# Patient Record
Sex: MALE | Race: WHITE | NOT HISPANIC OR LATINO | Employment: FULL TIME | URBAN - NONMETROPOLITAN AREA
[De-identification: names, ages, dates, MRNs, and addresses within clinical notes are randomized per-mention and may not be internally consistent; named-entity substitution may affect disease eponyms.]

---

## 2017-09-25 ENCOUNTER — NON-PROVIDER VISIT (OUTPATIENT)
Dept: URGENT CARE | Facility: PHYSICIAN GROUP | Age: 22
End: 2017-09-25

## 2017-09-25 DIAGNOSIS — Z02.1 PRE-EMPLOYMENT DRUG SCREENING: ICD-10-CM

## 2017-09-25 LAB
AMP AMPHETAMINE: NORMAL
COC COCAINE: NORMAL
INT CON NEG: NEGATIVE
INT CON POS: POSITIVE
MET METHAMPHETAMINES: NORMAL
OPI OPIATES: NORMAL
PCP PHENCYCLIDINE: NORMAL
POC DRUG COMMENT 753798-OCCUPATIONAL HEALTH: NORMAL
THC: NORMAL

## 2017-09-25 PROCEDURE — 80305 DRUG TEST PRSMV DIR OPT OBS: CPT | Performed by: PHYSICIAN ASSISTANT

## 2022-02-18 ENCOUNTER — HOSPITAL ENCOUNTER (OUTPATIENT)
Facility: MEDICAL CENTER | Age: 27
End: 2022-02-18
Attending: PHYSICIAN ASSISTANT
Payer: COMMERCIAL

## 2022-02-18 ENCOUNTER — OFFICE VISIT (OUTPATIENT)
Dept: URGENT CARE | Facility: PHYSICIAN GROUP | Age: 27
End: 2022-02-18
Payer: COMMERCIAL

## 2022-02-18 VITALS
DIASTOLIC BLOOD PRESSURE: 80 MMHG | RESPIRATION RATE: 16 BRPM | TEMPERATURE: 98.9 F | OXYGEN SATURATION: 97 % | HEIGHT: 75 IN | HEART RATE: 86 BPM | BODY MASS INDEX: 39.17 KG/M2 | SYSTOLIC BLOOD PRESSURE: 110 MMHG | WEIGHT: 315 LBS

## 2022-02-18 DIAGNOSIS — R11.0 NAUSEA: ICD-10-CM

## 2022-02-18 DIAGNOSIS — J06.9 UPPER RESPIRATORY TRACT INFECTION, UNSPECIFIED TYPE: ICD-10-CM

## 2022-02-18 DIAGNOSIS — R05.9 COUGH: ICD-10-CM

## 2022-02-18 LAB
FLUAV+FLUBV AG SPEC QL IA: NEGATIVE
INT CON NEG: NEGATIVE
INT CON POS: POSITIVE

## 2022-02-18 PROCEDURE — 99203 OFFICE O/P NEW LOW 30 MIN: CPT | Performed by: PHYSICIAN ASSISTANT

## 2022-02-18 PROCEDURE — U0003 INFECTIOUS AGENT DETECTION BY NUCLEIC ACID (DNA OR RNA); SEVERE ACUTE RESPIRATORY SYNDROME CORONAVIRUS 2 (SARS-COV-2) (CORONAVIRUS DISEASE [COVID-19]), AMPLIFIED PROBE TECHNIQUE, MAKING USE OF HIGH THROUGHPUT TECHNOLOGIES AS DESCRIBED BY CMS-2020-01-R: HCPCS

## 2022-02-18 PROCEDURE — 87804 INFLUENZA ASSAY W/OPTIC: CPT | Performed by: PHYSICIAN ASSISTANT

## 2022-02-18 PROCEDURE — U0005 INFEC AGEN DETEC AMPLI PROBE: HCPCS

## 2022-02-18 RX ORDER — ONDANSETRON HYDROCHLORIDE 8 MG/1
8 TABLET, FILM COATED ORAL EVERY 8 HOURS PRN
Qty: 15 TABLET | Refills: 0 | Status: SHIPPED | OUTPATIENT
Start: 2022-02-18 | End: 2022-06-04

## 2022-02-19 DIAGNOSIS — R05.9 COUGH: ICD-10-CM

## 2022-02-19 DIAGNOSIS — J06.9 UPPER RESPIRATORY TRACT INFECTION, UNSPECIFIED TYPE: ICD-10-CM

## 2022-02-19 LAB
COVID ORDER STATUS COVID19: NORMAL
SARS-COV-2 RNA RESP QL NAA+PROBE: NOTDETECTED
SPECIMEN SOURCE: NORMAL

## 2022-02-22 ASSESSMENT — ENCOUNTER SYMPTOMS
VOMITING: 1
EYE DISCHARGE: 0
NAUSEA: 1
SINUS PRESSURE: 1
MYALGIAS: 1
HEADACHES: 1
DIZZINESS: 0
COUGH: 1
CHILLS: 1
EYE REDNESS: 0
SORE THROAT: 1
NECK PAIN: 0
SHORTNESS OF BREATH: 0
SPUTUM PRODUCTION: 0
WHEEZING: 0
FATIGUE: 1
MUSCULOSKELETAL PAIN: 1

## 2022-02-22 NOTE — PROGRESS NOTES
Subjective     Junaid Winn is a 26 y.o. male who presents with Nausea (Today/), Vomiting (X 2 today), Headache, Muscle Pain, and Fatigue (Started yesterday)          Patient is a pleasant 26-year-old male who presents to urgent care with nausea, vomiting, headache, body aches profound fatigue that all started yesterday.  Patient is able to tolerate p.o. fluids along with food however continues to have mild nausea.  Nausea  Associated symptoms include chills, congestion, coughing, fatigue, headaches, myalgias, nausea, a sore throat and vomiting. Pertinent negatives include no neck pain or rash.   Vomiting  Associated symptoms include chills, congestion, coughing, fatigue, headaches, myalgias, nausea, a sore throat and vomiting. Pertinent negatives include no neck pain or rash.   Headache   This is a new problem. The current episode started yesterday. The problem has been gradually worsening. The pain is located in the bilateral region. Associated symptoms include coughing, muscle aches, nausea, sinus pressure, a sore throat and vomiting. Pertinent negatives include no dizziness, ear pain, eye redness or neck pain.   Muscle Pain  Associated symptoms include chills, congestion, coughing, fatigue, headaches, myalgias, nausea, a sore throat and vomiting. Pertinent negatives include no neck pain or rash.   Fatigue  Associated symptoms include chills, congestion, coughing, fatigue, headaches, myalgias, nausea, a sore throat and vomiting. Pertinent negatives include no neck pain or rash.   Of note patient is with his girlfriend today who is also being evaluated for similar symptoms.  Patient does work out [employer] and has significant Covid exposure.  Denies vaccination to COVID-19.    Review of Systems   Constitutional: Positive for chills, fatigue and malaise/fatigue.   HENT: Positive for congestion, sinus pressure and sore throat. Negative for ear discharge and ear pain.    Eyes: Negative for discharge and redness.  "  Respiratory: Positive for cough. Negative for sputum production, shortness of breath and wheezing.    Gastrointestinal: Positive for nausea and vomiting.   Musculoskeletal: Positive for myalgias. Negative for neck pain.   Skin: Negative for itching and rash.   Neurological: Positive for headaches. Negative for dizziness.              Objective     /80   Pulse 86   Temp 37.2 °C (98.9 °F) (Temporal)   Resp 16   Ht 1.905 m (6' 3\")   Wt (!) 144 kg (317 lb)   SpO2 97%   BMI 39.62 kg/m²    PMH:  has a past medical history of Psychiatric problem.  MEDS: Reviewed .   ALLERGIES: No Known Allergies  SURGHX:   Past Surgical History:   Procedure Laterality Date   • PERCUTANEOUOSPINNING UPPER EXTREMITY Right 5/4/2015    Procedure: PERCUTANEOUOSPINNING UPPER EXTREMITY;  Surgeon: Juan Manuel Frances M.D.;  Location: SURGERY [facility];  Service:      SOCHX:  reports that he has never smoked. He has never used smokeless tobacco. He reports previous drug use. Drug: Inhaled. He reports that he does not drink alcohol.  FH: Family history was reviewed, no pertinent findings to report    Physical Exam  Vitals reviewed.   Constitutional:       Appearance: Normal appearance. He is well-developed.   HENT:      Head: Normocephalic and atraumatic.      Ears:      Comments: Bilateral clear middle ear effusions.     Nose:      Comments: Rhinorrhea noted.     Mouth/Throat:      Comments: Posterior oropharynx is erythematous, positive postnasal drainage.  No evidence of exudate.  Eyes:      Conjunctiva/sclera: Conjunctivae normal.      Pupils: Pupils are equal, round, and reactive to light.   Cardiovascular:      Rate and Rhythm: Normal rate and regular rhythm.      Heart sounds: No murmur heard.  Pulmonary:      Effort: Pulmonary effort is normal. No respiratory distress.      Breath sounds: Normal breath sounds.   Musculoskeletal:         General: Normal range of motion.      Cervical back: Normal range of motion and neck " supple.      Right lower leg: No edema.      Left lower leg: No edema.   Lymphadenopathy:      Cervical: No cervical adenopathy.   Skin:     General: Skin is warm.      Findings: No rash.   Neurological:      Mental Status: He is alert and oriented to person, place, and time.   Psychiatric:         Mood and Affect: Mood normal.         Behavior: Behavior normal.         Thought Content: Thought content normal.                             Assessment & Plan        1. Upper respiratory tract infection, unspecified type  - COVID/SARS CoV-2 PCR; Future  - POCT Influenza A/B    2. Cough  - COVID/SARS CoV-2 PCR; Future  - POCT Influenza A/B    3. Nausea  - ondansetron (ZOFRAN) 8 MG Tab; Take 1 Tablet by mouth every 8 hours as needed for Nausea/Vomiting.  Dispense: 15 Tablet; Refill: 0              Neg. Flu.   Appropriate PPE worn at all times by provider.   Pt. Had face mask on throughout entirety of the visit other than oropharyngeal examination today.     Testing performed for COVID-19.    Patient currently without indication of need for higher level of care.    Reviewed with patient/guardian that if they do test positive they will be contacted by their local health department regarding return to work/school protocols.  Results will also be [facility] or called to the patient/guardian directly. Discussed isolation/quarantine recommendations.  Encouraged mask use, frequent handwashing, wiping down hard surfaces, etc.    Patient and/or guardian given precautionary s/sx that mandate immediate follow up and evaluation in the ED. Advised of risks of not doing so.  Side effects of OTC or prescribed medications discussed.   DDX, Supportive care, and indications for immediate follow-up discussed.  Instructed to return to clinic or nearest emergency department if we are not available for any change in condition, further concerns, or worsening of symptoms.    The patient and/or guardian demonstrated a  good understanding and agreed with the treatment plan.    Please note that this dictation was created using voice recognition software. I have made every reasonable attempt to correct obvious errors, but I expect that there are errors of grammar and possibly content that I did not discover before finalizing the note.

## 2022-06-04 ENCOUNTER — OFFICE VISIT (OUTPATIENT)
Dept: URGENT CARE | Facility: PHYSICIAN GROUP | Age: 27
End: 2022-06-04
Payer: COMMERCIAL

## 2022-06-04 VITALS
DIASTOLIC BLOOD PRESSURE: 80 MMHG | SYSTOLIC BLOOD PRESSURE: 110 MMHG | TEMPERATURE: 98.4 F | WEIGHT: 315 LBS | HEIGHT: 76 IN | OXYGEN SATURATION: 97 % | HEART RATE: 86 BPM | RESPIRATION RATE: 16 BRPM | BODY MASS INDEX: 38.36 KG/M2

## 2022-06-04 DIAGNOSIS — J35.8 TONSILLAR EXUDATE: ICD-10-CM

## 2022-06-04 DIAGNOSIS — Z11.52 ENCOUNTER FOR SCREENING FOR COVID-19: ICD-10-CM

## 2022-06-04 DIAGNOSIS — R11.2 NAUSEA AND VOMITING, INTRACTABILITY OF VOMITING NOT SPECIFIED, UNSPECIFIED VOMITING TYPE: ICD-10-CM

## 2022-06-04 DIAGNOSIS — R68.89 FLU-LIKE SYMPTOMS: ICD-10-CM

## 2022-06-04 LAB
EXTERNAL QUALITY CONTROL: NORMAL
FLUAV+FLUBV AG SPEC QL IA: NEGATIVE
INT CON NEG: NEGATIVE
INT CON NEG: NEGATIVE
INT CON POS: POSITIVE
INT CON POS: POSITIVE
S PYO AG THROAT QL: NEGATIVE
SARS-COV+SARS-COV-2 AG RESP QL IA.RAPID: NEGATIVE

## 2022-06-04 PROCEDURE — 87804 INFLUENZA ASSAY W/OPTIC: CPT | Performed by: NURSE PRACTITIONER

## 2022-06-04 PROCEDURE — 87880 STREP A ASSAY W/OPTIC: CPT | Performed by: NURSE PRACTITIONER

## 2022-06-04 PROCEDURE — 99214 OFFICE O/P EST MOD 30 MIN: CPT | Mod: CS | Performed by: NURSE PRACTITIONER

## 2022-06-04 PROCEDURE — 87426 SARSCOV CORONAVIRUS AG IA: CPT | Performed by: NURSE PRACTITIONER

## 2022-06-04 RX ORDER — ONDANSETRON 4 MG/1
4 TABLET, FILM COATED ORAL EVERY 4 HOURS PRN
Qty: 20 TABLET | Refills: 0 | Status: SHIPPED | OUTPATIENT
Start: 2022-06-04 | End: 2022-06-08

## 2022-06-04 ASSESSMENT — ENCOUNTER SYMPTOMS
SINUS PAIN: 0
CHILLS: 1
SPUTUM PRODUCTION: 0
CONSTIPATION: 0
FEVER: 0
SHORTNESS OF BREATH: 0
COUGH: 0
VOMITING: 0
ABDOMINAL PAIN: 0
NAUSEA: 1
DIARRHEA: 0
HEARTBURN: 0
PALPITATIONS: 0
SORE THROAT: 0

## 2022-06-04 NOTE — LETTER
[facility] [address] [address]     June 4, 2022    Patient: Junaid Winn   YOB: 1995   Date of Visit: 6/4/2022       To Whom It May Concern:    Junaid Winn was seen and treated in our department on 6/4/2022. May return to work on June 7, 2022.     Sincerely,         ANTOINE Romero.

## 2022-06-04 NOTE — PATIENT INSTRUCTIONS
HELPS FOR COLDS OR VIRUSES    - Tylenol 650mg every 4-6 hours as needed       - Robitussin plain or DM 2 teaspoons every 4-6 hours as needed  -  Hot team with honey  - Warm salt water gargles         - Saline nasal spray ([place]cean)

## 2022-06-04 NOTE — PROGRESS NOTES
"Subjective     Junaid Winn is a 26 y.o. male who presents with Nausea (X 2-3 days), Nasal Congestion (X 2-3 days), Lightheadedness (X 2-3 days), and Fatigue (X 2-3 days)            Started 2 days ago with nausea, Vomited once, some chills,  nasal congestion, lightheadedness and fatigue.   No Fever, oss of teaset or smell, diarrhea, ear pain, sore throat, cough.  Not COVID/Flu Vacc.  Has not done at home COVID test.  People at work have similar symptome's.   OTC: IBU that has helped.       Review of Systems   Constitutional: Positive for chills and malaise/fatigue. Negative for fever.   HENT: Positive for congestion. Negative for ear pain, sinus pain and sore throat.    Respiratory: Negative for cough, sputum production and shortness of breath.    Cardiovascular: Negative for chest pain and palpitations.   Gastrointestinal: Positive for nausea. Negative for abdominal pain, constipation, diarrhea, heartburn and vomiting.   Neurological:        Lightheadedness              Objective     /80   Pulse 86   Temp 36.9 °C (98.4 °F) (Temporal)   Resp 16   Ht 1.93 m (6' 4\")   Wt (!) 149 kg (329 lb)   SpO2 97%   BMI 40.05 kg/m²      Physical Exam  Constitutional:       Appearance: Normal appearance. He is not ill-appearing.   HENT:      Right Ear: Tympanic membrane is erythematous. Tympanic membrane is not retracted or bulging.      Left Ear: Tympanic membrane is erythematous. Tympanic membrane is not retracted or bulging.      Nose: Congestion and rhinorrhea present.      Right Turbinates: Swollen.      Left Turbinates: Swollen.      Right Sinus: No maxillary sinus tenderness or frontal sinus tenderness.      Left Sinus: No maxillary sinus tenderness or frontal sinus tenderness.      Mouth/Throat:      Pharynx: Posterior oropharyngeal erythema present.      Tonsils: Tonsillar exudate present. No tonsillar abscesses.   Cardiovascular:      Pulses: Normal pulses.      Heart sounds: Normal heart sounds. "   Pulmonary:      Effort: Pulmonary effort is normal. No respiratory distress.      Breath sounds: Normal breath sounds. No stridor. No wheezing or rales.   Lymphadenopathy:      Cervical: No cervical adenopathy.   Skin:     General: Skin is warm and dry.   Neurological:      General: No focal deficit present.      Mental Status: He is alert and oriented to person, place, and time.       Component      Latest Ref Rng & Units 6/4/2022 6/4/2022 6/4/2022          10:36 AM 10:37 AM 10:38 AM   Rapid Influenza A-B         negative   Internal Control Positive       Positive  Positive   Internal Control Negative       Negative  Negative   Rapid Strep Screen       negative     Internal         Valid    SARS-COV ANTIGEN NIKA      Negative, Indeterminate, None Detected, Valid, Invalid, Pass  Negative        Assessment & Plan      26-year-old patient presents with viral-like symptoms.  COVID test was negative.  Influenza was negative.  Strep was negative.  Discussed at length supportive care including warm salt water gargles, hot tea with lemon, humidifiers, ensuring adequate hydration and nutrition.  Due to symptoms of nausea, prescription of Zofran sent to pharmacy.  Work note was provided.  If symptoms persist or worsen patient should be followed up in urgent care, ER, or by primary care provider in 3 to 5 days.    1. Encounter for screening for COVID-19  - POCT SARS-COV Antigen NIKA Manual Result    2. Tonsillar exudate  - POCT Rapid Strep A    3. Flu-like symptoms  - POCT Influenza A/B    4. Nausea and vomiting, intractability of vomiting not specified, unspecified vomiting type  - ondansetron (ZOFRAN) 4 MG Tab tablet; Take 1 Tablet by mouth every four hours as needed for Nausea/Vomiting for up to 4 days.  Dispense: 20 Tablet; Refill: 0             There are no diagnoses linked to this encounter.         This dictation was created using voice recognition software. I have made reasonable attempts to correct  errors, however, errors of grammar and content may exist.    Return if symptoms worsen or fail to improve.

## 2022-06-05 ENCOUNTER — TELEPHONE (OUTPATIENT)
Dept: URGENT CARE | Facility: CLINIC | Age: 27
End: 2022-06-05

## 2022-06-07 NOTE — TELEPHONE ENCOUNTER
Spoke to patient- called [facility] and verbally called in medication zofran. Patient informed may be ready tonight or tomorrow

## 2022-06-25 ENCOUNTER — OFFICE VISIT (OUTPATIENT)
Dept: URGENT CARE | Facility: PHYSICIAN GROUP | Age: 27
End: 2022-06-25
Payer: COMMERCIAL

## 2022-06-25 VITALS
HEIGHT: 76 IN | OXYGEN SATURATION: 97 % | SYSTOLIC BLOOD PRESSURE: 130 MMHG | TEMPERATURE: 98.5 F | HEART RATE: 61 BPM | RESPIRATION RATE: 16 BRPM | DIASTOLIC BLOOD PRESSURE: 80 MMHG | BODY MASS INDEX: 38.36 KG/M2 | WEIGHT: 315 LBS

## 2022-06-25 DIAGNOSIS — R11.0 NAUSEA: ICD-10-CM

## 2022-06-25 DIAGNOSIS — R51.9 ACUTE NONINTRACTABLE HEADACHE, UNSPECIFIED HEADACHE TYPE: ICD-10-CM

## 2022-06-25 DIAGNOSIS — B34.9 VIREMIA: ICD-10-CM

## 2022-06-25 LAB
EXTERNAL QUALITY CONTROL: NORMAL
SARS-COV+SARS-COV-2 AG RESP QL IA.RAPID: NEGATIVE

## 2022-06-25 PROCEDURE — 87426 SARSCOV CORONAVIRUS AG IA: CPT | Performed by: NURSE PRACTITIONER

## 2022-06-25 PROCEDURE — 99213 OFFICE O/P EST LOW 20 MIN: CPT | Performed by: NURSE PRACTITIONER

## 2022-06-25 RX ORDER — PROMETHAZINE HYDROCHLORIDE 25 MG/1
TABLET ORAL
COMMUNITY
Start: 2022-06-16 | End: 2022-06-25

## 2022-06-25 ASSESSMENT — ENCOUNTER SYMPTOMS
BLOOD IN STOOL: 0
VOMITING: 0
HEMOPTYSIS: 0
SORE THROAT: 0
FEVER: 0
LOSS OF CONSCIOUSNESS: 0
WHEEZING: 0
DIZZINESS: 1
SPEECH CHANGE: 0
DIARRHEA: 1
SHORTNESS OF BREATH: 0
HEADACHES: 1
MYALGIAS: 1
TINGLING: 0
COUGH: 0
WEAKNESS: 0
SPUTUM PRODUCTION: 0
CHILLS: 0
FOCAL WEAKNESS: 0
NAUSEA: 1
SENSORY CHANGE: 0
SINUS PAIN: 0
ABDOMINAL PAIN: 0

## 2022-06-25 NOTE — LETTER
June 25, 2022         Patient: Junaid Winn   YOB: 1995   Date of Visit: 6/25/2022           To Whom it May Concern:    Junaid Winn was seen in my clinic on 6/25/2022. He may return to work in 2-3 days when symptoms improve.  Please excuse his absence due to illness.    If you have any questions or concerns, please don't hesitate to call.        Sincerely,           ANTOINE Thompson.  Electronically Signed

## 2022-06-25 NOTE — PROGRESS NOTES
"Subjective     Junaid Winn is a 26 y.o. male who presents with Headache (X 4-5 days worse the last 2 days), Nausea, and Dizziness            Junaid comes in today with a 4 day history of headache, myalgia, feeling sweaty, nausea, dizziness and diarrhea.  He notes mild nasal congestion as well.  No cough, chest pain or shortness of breath.  No abdominal pain.  He is taking ibuprofen with minimal relief.  His girlfriend is being seen as well for URI symptoms.  They have left over zofran at home from another recent GI illness but have not tried any.      Review of Systems   Constitutional: Positive for malaise/fatigue. Negative for chills and fever.   HENT: Positive for congestion. Negative for ear pain, sinus pain and sore throat.    Respiratory: Negative for cough, hemoptysis, sputum production, shortness of breath and wheezing.    Cardiovascular: Negative for chest pain.   Gastrointestinal: Positive for diarrhea and nausea. Negative for abdominal pain, blood in stool, melena and vomiting.   Musculoskeletal: Positive for myalgias.   Neurological: Positive for dizziness and headaches. Negative for tingling, sensory change, speech change, focal weakness, loss of consciousness and weakness.     Medications, Allergies, and current problem list reviewed today in Epic         Objective     Blood Pressure 130/80   Pulse 61   Temperature 36.9 °C (98.5 °F) (Temporal)   Respiration 16   Height 1.93 m (6' 4\")   Weight (Abnormal) 146 kg (321 lb)   Oxygen Saturation 97%   Body Mass Index 39.07 kg/m²      Physical Exam  Vitals reviewed.   Constitutional:       General: He is not in acute distress.     Appearance: Normal appearance. He is well-developed. He is not toxic-appearing or diaphoretic.   HENT:      Right Ear: Tympanic membrane, ear canal and external ear normal. There is no impacted cerumen.      Left Ear: Tympanic membrane, ear canal and external ear normal. There is no impacted cerumen.      Nose: " Congestion present. No rhinorrhea.      Mouth/Throat:      Mouth: Mucous membranes are moist.      Pharynx: Posterior oropharyngeal erythema present. No oropharyngeal exudate.   Eyes:      General: No scleral icterus.        Right eye: No discharge.         Left eye: No discharge.      Conjunctiva/sclera: Conjunctivae normal.   Neck:      Thyroid: No thyromegaly.      Vascular: No JVD.      Trachea: No tracheal deviation.   Cardiovascular:      Rate and Rhythm: Normal rate and regular rhythm.      Heart sounds: Normal heart sounds. No murmur heard.    No friction rub. No gallop.   Pulmonary:      Effort: Pulmonary effort is normal. No respiratory distress.      Breath sounds: Normal breath sounds. No stridor. No wheezing, rhonchi or rales.   Chest:      Chest wall: No tenderness.   Abdominal:      Palpations: Abdomen is soft.      Tenderness: There is no abdominal tenderness. There is no right CVA tenderness, left CVA tenderness or guarding.   Musculoskeletal:      Cervical back: Neck supple.   Lymphadenopathy:      Cervical: No cervical adenopathy.   Skin:     General: Skin is warm and dry.      Coloration: Skin is not jaundiced or pale.   Neurological:      Mental Status: He is alert and oriented to person, place, and time.   Psychiatric:         Mood and Affect: Mood normal.             POCT covid antigen: negative                Assessment & Plan        1. Viremia  - POCT SARS-COV Antigen NIKA (Symptomatic only)    2. Acute nonintractable headache, unspecified headache type  - POCT SARS-COV Antigen NIKA (Symptomatic only)    3. Nausea  - POCT SARS-COV Antigen NIKA (Symptomatic only)    Advised Junaid that based on the history and exam findings, this is likely a self-limiting viral illness.  There is no indication for antibiotics at this time.  Differential reviewed.  OTC NSAIDs or tylenol prn pain/headache.  Zofran 4 mg Q 6 hours prn nausea/vomiting.  No new rx needed as he has this medication at home.    Maintain  adequate po hydration.  RTC in 5-7 days if symptoms persist, sooner if worse.  ED precautions reviewed.  He verbalized understanding of and agreed with plan of care.